# Patient Record
Sex: FEMALE | Race: WHITE | Employment: FULL TIME | URBAN - METROPOLITAN AREA
[De-identification: names, ages, dates, MRNs, and addresses within clinical notes are randomized per-mention and may not be internally consistent; named-entity substitution may affect disease eponyms.]

---

## 2017-07-20 ENCOUNTER — HOSPITAL ENCOUNTER (OUTPATIENT)
Dept: GENERAL RADIOLOGY | Age: 47
Discharge: HOME | End: 2017-07-20

## 2017-07-20 DIAGNOSIS — M05.9 SEROPOSITIVE RHEUMATOID ARTHRITIS (HCC): ICD-10-CM

## 2017-07-20 PROCEDURE — 73630 X-RAY EXAM OF FOOT: CPT

## 2017-07-20 PROCEDURE — 73610 X-RAY EXAM OF ANKLE: CPT

## 2017-07-20 PROCEDURE — 73130 X-RAY EXAM OF HAND: CPT

## 2017-07-20 PROCEDURE — 73110 X-RAY EXAM OF WRIST: CPT

## 2018-08-13 ENCOUNTER — HOSPITAL ENCOUNTER (OUTPATIENT)
Dept: GENERAL RADIOLOGY | Age: 48
Discharge: HOME | End: 2018-08-13

## 2018-08-13 DIAGNOSIS — M05.9 SEROPOSITIVE RHEUMATOID ARTHRITIS (HCC): ICD-10-CM

## 2018-08-13 PROCEDURE — 73030 X-RAY EXAM OF SHOULDER: CPT

## 2018-08-23 ENCOUNTER — HOSPITAL ENCOUNTER (OUTPATIENT)
Dept: MRI IMAGING | Age: 48
Discharge: HOME | End: 2018-08-23
Attending: INTERNAL MEDICINE
Payer: COMMERCIAL

## 2018-08-23 DIAGNOSIS — M75.42 IMPINGEMENT SYNDROME, SHOULDER, LEFT: ICD-10-CM

## 2018-08-23 PROCEDURE — 73221 MRI JOINT UPR EXTREM W/O DYE: CPT

## 2018-09-10 ENCOUNTER — HOSPITAL ENCOUNTER (OUTPATIENT)
Dept: MRI IMAGING | Age: 48
Discharge: HOME | End: 2018-09-10
Attending: INTERNAL MEDICINE
Payer: COMMERCIAL

## 2018-09-10 PROCEDURE — 73221 MRI JOINT UPR EXTREM W/O DYE: CPT

## 2024-04-22 ENCOUNTER — HOSPITAL ENCOUNTER (OUTPATIENT)
Facility: HOSPITAL | Age: 54
Discharge: HOME | End: 2024-04-25

## 2024-04-22 DIAGNOSIS — E78.00 PURE HYPERCHOLESTEROLEMIA: ICD-10-CM

## 2024-04-22 PROCEDURE — 75571 CT HRT W/O DYE W/CA TEST: CPT

## 2024-09-09 ENCOUNTER — OFFICE VISIT (OUTPATIENT)
Age: 54
End: 2024-09-09

## 2024-09-09 VITALS
WEIGHT: 127 LBS | HEART RATE: 61 BPM | OXYGEN SATURATION: 100 % | DIASTOLIC BLOOD PRESSURE: 64 MMHG | RESPIRATION RATE: 16 BRPM | SYSTOLIC BLOOD PRESSURE: 99 MMHG | TEMPERATURE: 98 F

## 2024-09-09 DIAGNOSIS — R82.90 CLOUDY URINE: ICD-10-CM

## 2024-09-09 DIAGNOSIS — N39.0 ACUTE UTI: Primary | ICD-10-CM

## 2024-09-09 LAB
BILIRUBIN, URINE, POC: NEGATIVE
BLOOD URINE, POC: NEGATIVE
GLUCOSE URINE, POC: NEGATIVE
KETONES, URINE, POC: NEGATIVE
LEUKOCYTE ESTERASE, URINE, POC: ABNORMAL
NITRITE, URINE, POC: NEGATIVE
PH, URINE, POC: 7 (ref 4.6–8)
PROTEIN,URINE, POC: NEGATIVE
SPECIFIC GRAVITY, URINE, POC: 1.01 (ref 1–1.03)
URINALYSIS CLARITY, POC: ABNORMAL
URINALYSIS COLOR, POC: YELLOW
UROBILINOGEN, POC: ABNORMAL

## 2024-09-09 RX ORDER — BUPROPION HYDROCHLORIDE 300 MG/1
300 TABLET ORAL DAILY
COMMUNITY
Start: 2022-10-11

## 2024-09-09 RX ORDER — ADALIMUMAB 40MG/0.4ML
KIT SUBCUTANEOUS
COMMUNITY
Start: 2022-12-21

## 2024-09-09 RX ORDER — NITROFURANTOIN 25; 75 MG/1; MG/1
100 CAPSULE ORAL 2 TIMES DAILY
Qty: 14 CAPSULE | Refills: 0 | Status: SHIPPED | OUTPATIENT
Start: 2024-09-09 | End: 2024-09-16

## 2024-09-09 RX ORDER — FOLIC ACID 0.8 MG
TABLET ORAL EVERY 24 HOURS
COMMUNITY

## 2024-09-09 RX ORDER — METHOTREXATE 2.5 MG/1
TABLET ORAL
COMMUNITY
Start: 2022-11-22

## 2024-09-09 RX ORDER — FOLIC ACID AND CYANOCOBALAMIN 1; 500 MG/1; UG/1
TABLET, FILM COATED ORAL
COMMUNITY

## 2024-09-09 RX ORDER — ASPIRIN 81 MG/1
TABLET ORAL
COMMUNITY
Start: 2024-05-28

## 2024-09-09 RX ORDER — EVOLOCUMAB 140 MG/ML
INJECTION, SOLUTION SUBCUTANEOUS
COMMUNITY
Start: 2022-10-31

## 2024-09-12 LAB — BACTERIA UR CULT: ABNORMAL

## 2025-01-21 ENCOUNTER — OFFICE VISIT (OUTPATIENT)
Age: 55
End: 2025-01-21

## 2025-01-21 VITALS
OXYGEN SATURATION: 100 % | TEMPERATURE: 98.7 F | HEART RATE: 80 BPM | WEIGHT: 134 LBS | SYSTOLIC BLOOD PRESSURE: 100 MMHG | RESPIRATION RATE: 16 BRPM | DIASTOLIC BLOOD PRESSURE: 65 MMHG

## 2025-01-21 DIAGNOSIS — N39.0 ACUTE UTI: Primary | ICD-10-CM

## 2025-01-21 DIAGNOSIS — R35.0 URINARY FREQUENCY: ICD-10-CM

## 2025-01-21 LAB
BILIRUBIN, URINE, POC: NEGATIVE
BLOOD URINE, POC: ABNORMAL
GLUCOSE URINE, POC: NEGATIVE
KETONES, URINE, POC: NEGATIVE
LEUKOCYTE ESTERASE, URINE, POC: ABNORMAL
NITRITE, URINE, POC: NEGATIVE
PH, URINE, POC: 6 (ref 4.6–8)
PROTEIN,URINE, POC: ABNORMAL
SPECIFIC GRAVITY, URINE, POC: 1.01 (ref 1–1.03)
URINALYSIS CLARITY, POC: CLEAR
URINALYSIS COLOR, POC: YELLOW
UROBILINOGEN, POC: ABNORMAL

## 2025-01-21 RX ORDER — ROSUVASTATIN CALCIUM 10 MG/1
10 TABLET, COATED ORAL DAILY
COMMUNITY
Start: 2024-12-18

## 2025-01-21 RX ORDER — NITROFURANTOIN 25; 75 MG/1; MG/1
100 CAPSULE ORAL 2 TIMES DAILY
Qty: 14 CAPSULE | Refills: 0 | Status: SHIPPED | OUTPATIENT
Start: 2025-01-21 | End: 2025-01-28

## 2025-01-21 RX ORDER — ADALIMUMAB 40MG/0.4ML
KIT SUBCUTANEOUS
COMMUNITY
Start: 2025-01-07

## 2025-01-21 NOTE — PATIENT INSTRUCTIONS
Presentation and testing today is consistent with urinary tract infection  No fevers/chills or flank pain to suggest ascending infection or complications at this time  Will treat with Macrobid as directed  Urine culture sent for confirmation, will contact you in 2-3 days with results  Continue to drink lots of fluids  Follow up here or with PCP if symptoms persist  Go to the ED with any high fevers/chills, severe back/flank pain, or if vomiting and unable to tolerate fluids/medication

## 2025-01-21 NOTE — PROGRESS NOTES
Katey Flynn (:  1970) is a 54 y.o. female,Established patient, here for evaluation of the following chief complaint(s):  Urinary Frequency (Urinary frequency, cloudy urine with odor, low back pain, x5 days)      Assessment & Plan :  Visit Diagnoses and Associated Orders       Acute UTI    -  Primary    nitrofurantoin, macrocrystal-monohydrate, (MACROBID) 100 MG capsule [35870]           Urinary frequency        AMB POC URINALYSIS DIP STICK AUTO W/O MICRO [16207 CPT(R)]      Urine culture (clean catch) [51086 Custom]           ORDERS WITHOUT AN ASSOCIATED DIAGNOSIS    rosuvastatin (CRESTOR) 10 MG tablet [09292]      HUMIRA, 2 PEN, 40 MG/0.4ML AJKT injection pen kit [137971]              Presentation and testing today is consistent with urinary tract infection  No fevers/chills or flank pain to suggest ascending infection or complications at this time  Will treat with Macrobid as directed  Urine culture sent for confirmation, will contact you in 2-3 days with results  Continue to drink lots of fluids  Follow up here or with PCP if symptoms persist  Go to the ED with any high fevers/chills, severe back/flank pain, or if vomiting and unable to tolerate fluids/medication       Subjective :    Urinary Frequency   Associated symptoms include frequency.        54 y.o. female presents with symptoms of 5 days of urinary frequency, feelings of urinary urgency and some low back pain.  She reports a history of UTIs recently.  Notes perhaps 3 or 4 UTIs over the past 6 months.  She was last seen about 4 months ago for the same issue and was treated successfully with Macrobid.  She denies any fevers, chills, body aches or fatigue.  She denies any CVA tenderness or flank pain but does report some mild low back pain.  No abdominal pain, nausea or vomiting.  No vaginal bleeding, discharge or odor.  Denies any history of kidney or liver problems.         Vitals:    25 1627   BP: 100/65   Site: Right Upper Arm

## 2025-01-25 LAB — BACTERIA UR CULT: ABNORMAL
